# Patient Record
Sex: MALE | Race: BLACK OR AFRICAN AMERICAN | Employment: FULL TIME | URBAN - METROPOLITAN AREA
[De-identification: names, ages, dates, MRNs, and addresses within clinical notes are randomized per-mention and may not be internally consistent; named-entity substitution may affect disease eponyms.]

---

## 2022-03-18 PROBLEM — N52.9 ERECTILE DYSFUNCTION: Status: ACTIVE | Noted: 2021-10-27

## 2022-03-19 PROBLEM — Z80.0 FAMILY HISTORY OF COLON CANCER: Status: ACTIVE | Noted: 2021-12-22

## 2022-03-19 PROBLEM — R97.20 ELEVATED PSA: Status: ACTIVE | Noted: 2021-12-22

## 2022-03-19 PROBLEM — Z82.49 FAMILY HISTORY OF AORTIC ANEURYSM: Status: ACTIVE | Noted: 2021-12-22

## 2022-06-17 ASSESSMENT — ENCOUNTER SYMPTOMS
BACK PAIN: 0
NAUSEA: 0

## 2022-06-17 NOTE — PROGRESS NOTES
[facility]  [address]  [address] [facility], [address] [facility]  [phone]    Boone Shanks  : 1960    Chief Complaint   Patient presents with    New Patient     hydrocele    Other     epididymal cyst         HPI     Boone Shanks is a 64 y.o. male Referred by Adrienne Daniel for evaluation and treatment of scrotal mass and elevated PSA. Previously saw urology in [place]. PSA 4.99 in ,   PSA 4.3 with 22% free PSA in . He lives in [place] and is moving here   Scrotal US in : FINDINGS:   Right testicle: Normal. No mass. No torsion. Normal vascular flow. Left testicle: Normal. No mass. No torsion. Normal vascular flow. Epididymides: Right epididymis unremarkable. Normal blood flow. 8 by 6 x 6 mm   left epididymal cyst.   Scrotum: Small right hydrocele. He states that the left testicular mass is getting bigger. It is painless. No family hx of prostate cancer. No voiding problems, no hematuria. Past Medical History:   Diagnosis Date    Cerumen impaction     Ear pain     Gastroesophageal reflux disease 2016    Otitis externa 2016    Unspecified hearing loss, unspecified ear      Past Surgical History:   Procedure Laterality Date    KNEE ARTHROSCOPY      KNEE CARTILAGE SURGERY       Current Outpatient Medications   Medication Sig Dispense Refill    pantoprazole (PROTONIX) 40 MG tablet Take 40 mg by mouth daily      dexlansoprazole (DEXILANT) 60 MG CPDR delayed release capsule TAKE 1 CAPSULE DAILY      fluocinolone (DERMOTIC) 0.01 % OIL oil 1 drop in affected ear TID, PRN      latanoprost (XALATAN) 0.005 % ophthalmic solution INSTILL ONE DROP INTO EACH EYE EVERY NIGHT AS DIRECTED      sildenafil (VIAGRA) 100 MG tablet Take 1/2 - 1 tablet 30 minutes prior to intercourse as needed. No current facility-administered medications for this visit.      No Known Allergies  Social History     Socioeconomic History    Marital status:      Spouse name: Not on file    Number of children: Not on file    Years of education: Not on file    Highest education level: Not on file   Occupational History    Not on file   Tobacco Use    Smoking status: Never Smoker    Smokeless tobacco: Never Used   Substance and Sexual Activity    Alcohol use: Yes    Drug use: Never    Sexual activity: Not on file   Other Topics Concern    Not on file   Social History Narrative    Not on file     Social Determinants of Health     Financial Resource Strain:     Difficulty of Paying Living Expenses: Not on file   Food Insecurity:     Worried About Running Out of Food in the Last Year: Not on file    Ana of Food in the Last Year: Not on file   Transportation Needs:     Lack of Transportation (Medical): Not on file    Lack of Transportation (Non-Medical):  Not on file   Physical Activity:     Days of Exercise per Week: Not on file    Minutes of Exercise per Session: Not on file   Stress:     Feeling of Stress : Not on file   Social Connections:     Frequency of Communication with Friends and Family: Not on file    Frequency of Social Gatherings with Friends and Family: Not on file    Attends Zoroastrian Services: Not on file    Active Member of [address] [place] [address] or Organizations: Not on file    Attends Club or Organization Meetings: Not on file    Marital Status: Not on file   Intimate Partner Violence:     Fear of Current or Ex-Partner: Not on file    Emotionally Abused: Not on file    Physically Abused: Not on file    Sexually Abused: Not on file   Housing Stability:     Unable to Pay for Housing in the Last Year: Not on file    Number of [place] in the Last Year: Not on file    Unstable Housing in the Last Year: Not on file     Family History   Problem Relation Age of Onset    Abdominal aortic aneurysm Mother     Prostate Cancer Neg Hx     Colon Cancer Brother 46    Abdominal aortic aneurysm Father        Review of Systems  Constitutional:   Negative for fever.  Cardiovascular:  Negative for chest pain. GI:  Negative for nausea. Genitourinary:  Negative for urinary burning. Musculoskeletal:  Negative for back pain. There were no vitals taken for this visit. Physical Exam  General   Mental Status - Patient is alert and oriented X3. General Appearance - Not in acute distress. Build & Nutrition - Well nourished and Well developed. Gait - Normal.      Eye   Pupil - Bilateral - Normal, Equal and Round. Chest and Lung Exam   Auscultation:   Lung Sounds: - Normal, clear to auscultation. Cardiovascular   Cardiovascular examination reveals  - normal heart sounds, regular rate and rhythm with no murmurs. Abdomen   Palpation/Percussion: Palpation and Percussion of the abdomen reveal - No Rebound tenderness, No Rigidity (guarding), No hepatosplenomegaly and No Palpable abdominal masses. Auscultation: Auscultation of the abdomen reveals - Bowel sounds normal.      Male Genitourinary   Evaluation of genitourinary system reveals - scrotum non-tender, 8 mm nodule left anterior lateral testis, nontender. , possible small cyst of left inferior epididymis. , urethral meatus normal, no discharge, normal penis and normal seminal vesicles. Rectal   Anorectal Exam: Prostate - 1+, no nodules. Peripheral Vascular   Lower Extremity: Inspection - Bilateral - Inspection Normal.      Neurologic   Neurologic evaluation reveals  - upper and lower extremity deep tendon reflexes intact bilaterally . Cranial Nerves: - Cranial nerves are grossly intact. Neuropsychiatric   The patient's mood and affect are described as  - normal.      Musculoskeletal  Global Assessment   Examination of related systems reveals - no generalized swelling or edema of extremities. Lymphatic  General Lymphatics   Description - No Generalized lymphadenopathy. Tenderness - Non Tender.       Urinalysis  UA - Dipstick  Results for orders placed or performed in visit on 06/20/22   AMB

## 2022-06-20 ENCOUNTER — OFFICE VISIT (OUTPATIENT)
Dept: UROLOGY | Age: 62
End: 2022-06-20
Payer: COMMERCIAL

## 2022-06-20 DIAGNOSIS — R97.20 ELEVATED PSA: ICD-10-CM

## 2022-06-20 DIAGNOSIS — N50.89 TESTICULAR MASS: ICD-10-CM

## 2022-06-20 DIAGNOSIS — N43.3 HYDROCELE, UNSPECIFIED HYDROCELE TYPE: Primary | ICD-10-CM

## 2022-06-20 LAB
BILIRUBIN, URINE, POC: NEGATIVE
BLOOD URINE, POC: NEGATIVE
GLUCOSE URINE, POC: NEGATIVE
KETONES, URINE, POC: NEGATIVE
LEUKOCYTE ESTERASE, URINE, POC: NEGATIVE
NITRITE, URINE, POC: NEGATIVE
PH, URINE, POC: 7.5 (ref 4.6–8)
PROTEIN,URINE, POC: NEGATIVE
SPECIFIC GRAVITY, URINE, POC: 1.02 (ref 1–1.03)
URINALYSIS CLARITY, POC: NORMAL
URINALYSIS COLOR, POC: NORMAL
UROBILINOGEN, POC: NORMAL

## 2022-06-20 PROCEDURE — 76870 US EXAM SCROTUM: CPT | Performed by: UROLOGY

## 2022-06-20 PROCEDURE — 81003 URINALYSIS AUTO W/O SCOPE: CPT | Performed by: UROLOGY

## 2022-06-20 PROCEDURE — 99204 OFFICE O/P NEW MOD 45 MIN: CPT | Performed by: UROLOGY

## 2022-06-20 RX ORDER — PANTOPRAZOLE SODIUM 40 MG/1
40 TABLET, DELAYED RELEASE ORAL DAILY
COMMUNITY
Start: 2021-11-02 | End: 2022-09-12 | Stop reason: SDUPTHER

## 2022-09-12 ENCOUNTER — OFFICE VISIT (OUTPATIENT)
Dept: FAMILY MEDICINE CLINIC | Facility: CLINIC | Age: 62
End: 2022-09-12
Payer: COMMERCIAL

## 2022-09-12 VITALS
WEIGHT: 236 LBS | HEIGHT: 70 IN | HEART RATE: 80 BPM | OXYGEN SATURATION: 98 % | BODY MASS INDEX: 33.79 KG/M2 | SYSTOLIC BLOOD PRESSURE: 130 MMHG | DIASTOLIC BLOOD PRESSURE: 80 MMHG

## 2022-09-12 DIAGNOSIS — Z13.220 SCREENING FOR HYPERLIPIDEMIA: ICD-10-CM

## 2022-09-12 DIAGNOSIS — Z00.00 ENCOUNTER FOR ROUTINE ADULT HEALTH EXAMINATION WITHOUT ABNORMAL FINDINGS: Primary | ICD-10-CM

## 2022-09-12 DIAGNOSIS — K21.9 GASTROESOPHAGEAL REFLUX DISEASE, UNSPECIFIED WHETHER ESOPHAGITIS PRESENT: ICD-10-CM

## 2022-09-12 DIAGNOSIS — R97.20 ELEVATED PSA: ICD-10-CM

## 2022-09-12 DIAGNOSIS — Z82.49 FAMILY HISTORY OF AORTIC ANEURYSM: ICD-10-CM

## 2022-09-12 PROBLEM — N50.89 TESTICULAR LUMP: Status: ACTIVE | Noted: 2021-10-27

## 2022-09-12 LAB
BASOPHILS # BLD: 0.1 K/UL (ref 0–0.2)
BASOPHILS NFR BLD: 1 % (ref 0–2)
DIFFERENTIAL METHOD BLD: ABNORMAL
EOSINOPHIL # BLD: 0.1 K/UL (ref 0–0.8)
EOSINOPHIL NFR BLD: 1 % (ref 0.5–7.8)
ERYTHROCYTE [DISTWIDTH] IN BLOOD BY AUTOMATED COUNT: 14.4 % (ref 11.9–14.6)
HCT VFR BLD AUTO: 47.3 % (ref 41.1–50.3)
HGB BLD-MCNC: 15.1 G/DL (ref 13.6–17.2)
IMM GRANULOCYTES # BLD AUTO: 0.1 K/UL (ref 0–0.5)
IMM GRANULOCYTES NFR BLD AUTO: 1 % (ref 0–5)
LYMPHOCYTES # BLD: 1.8 K/UL (ref 0.5–4.6)
LYMPHOCYTES NFR BLD: 17 % (ref 13–44)
MCH RBC QN AUTO: 30.1 PG (ref 26.1–32.9)
MCHC RBC AUTO-ENTMCNC: 31.9 G/DL (ref 31.4–35)
MCV RBC AUTO: 94.4 FL (ref 79.6–97.8)
MONOCYTES # BLD: 0.9 K/UL (ref 0.1–1.3)
MONOCYTES NFR BLD: 9 % (ref 4–12)
NEUTS SEG # BLD: 7.4 K/UL (ref 1.7–8.2)
NEUTS SEG NFR BLD: 72 % (ref 43–78)
NRBC # BLD: 0 K/UL (ref 0–0.2)
PLATELET # BLD AUTO: 223 K/UL (ref 150–450)
PMV BLD AUTO: 13.3 FL (ref 9.4–12.3)
RBC # BLD AUTO: 5.01 M/UL (ref 4.23–5.6)
WBC # BLD AUTO: 10.3 K/UL (ref 4.3–11.1)

## 2022-09-12 PROCEDURE — 99396 PREV VISIT EST AGE 40-64: CPT | Performed by: FAMILY MEDICINE

## 2022-09-12 RX ORDER — PANTOPRAZOLE SODIUM 40 MG/1
40 TABLET, DELAYED RELEASE ORAL DAILY
Qty: 90 TABLET | Refills: 3 | Status: SHIPPED | OUTPATIENT
Start: 2022-09-12

## 2022-09-12 RX ORDER — TIMOLOL MALEATE 6.8 MG/ML
SOLUTION/ DROPS OPHTHALMIC
COMMUNITY
Start: 2022-02-16

## 2022-09-12 RX ORDER — TRIAMCINOLONE ACETONIDE 1 MG/G
CREAM TOPICAL
COMMUNITY
Start: 2022-01-09

## 2022-09-12 ASSESSMENT — ENCOUNTER SYMPTOMS
CHEST TIGHTNESS: 0
SHORTNESS OF BREATH: 0
ABDOMINAL PAIN: 0
BACK PAIN: 0
WHEEZING: 0

## 2022-09-12 ASSESSMENT — PATIENT HEALTH QUESTIONNAIRE - PHQ9
1. LITTLE INTEREST OR PLEASURE IN DOING THINGS: 0
SUM OF ALL RESPONSES TO PHQ QUESTIONS 1-9: 0
SUM OF ALL RESPONSES TO PHQ QUESTIONS 1-9: 0
2. FEELING DOWN, DEPRESSED OR HOPELESS: 0
SUM OF ALL RESPONSES TO PHQ QUESTIONS 1-9: 0
SUM OF ALL RESPONSES TO PHQ QUESTIONS 1-9: 0
SUM OF ALL RESPONSES TO PHQ9 QUESTIONS 1 & 2: 0

## 2022-09-12 NOTE — PROGRESS NOTES
Annetta Osborn is a 64 y.o. male who presents today for the following:  Chief Complaint   Patient presents with    Medication Check       No Known Allergies    Current Outpatient Medications   Medication Sig Dispense Refill    pantoprazole (PROTONIX) 40 MG tablet Take 40 mg by mouth daily      dexlansoprazole (DEXILANT) 60 MG CPDR delayed release capsule TAKE 1 CAPSULE DAILY      fluocinolone (DERMOTIC) 0.01 % OIL oil 1 drop in affected ear TID, PRN      latanoprost (XALATAN) 0.005 % ophthalmic solution INSTILL ONE DROP INTO EACH EYE EVERY NIGHT AS DIRECTED      sildenafil (VIAGRA) 100 MG tablet Take 1/2 - 1 tablet 30 minutes prior to intercourse as needed. No current facility-administered medications for this visit. Past Medical History:   Diagnosis Date    Cerumen impaction     Ear pain     Gastroesophageal reflux disease 7/20/2016    Otitis externa 7/20/2016    Unspecified hearing loss, unspecified ear        Past Surgical History:   Procedure Laterality Date    KNEE ARTHROSCOPY      KNEE CARTILAGE SURGERY         Social History     Tobacco Use    Smoking status: Never    Smokeless tobacco: Never   Substance Use Topics    Alcohol use: Yes        Family History   Problem Relation Age of Onset    Abdominal aortic aneurysm Mother     Prostate Cancer Neg Hx     Colon Cancer Brother 46    Abdominal aortic aneurysm Father        Is a 60-year-old gentleman here today for routine follow up. Seen in office 3 months ago for initial visit. He recently started a new job in [place] and is in the process of relocating from [facility]. Patient has no significant past medical history. He reports having a strong family history of abdominal aortic aneurysms. Reports both of his parents passed away from these. He has screening ultrasounds done every few years to evaluate. Last one was done last year was normal.  Patient also reports brother was diagnosed with colon cancer last year. He reports having 2 normal colonoscopies in the past.  He does also take a PPI for acid reflux. He does not believe he had endoscopy completed regarding reflux. Lastly he has a history of scrotal mass and elevated PSA. Since last appointment he has established with urology in [place]. He is scheduled to follow up this month with plans to recheck ultrasound and PSA level. Patient has no new concerns today. Review of Systems   Constitutional:  Negative for appetite change and fatigue. Respiratory:  Negative for chest tightness, shortness of breath and wheezing. Cardiovascular:  Negative for chest pain and palpitations. Gastrointestinal:  Negative for abdominal pain. Genitourinary:  Negative for difficulty urinating, scrotal swelling and testicular pain. Musculoskeletal:  Negative for back pain. Skin:  Negative for rash. Neurological:  Negative for dizziness and light-headedness. Psychiatric/Behavioral:  The patient is not nervous/anxious. /80   Pulse 80   Ht 5' 10\" (1.778 m)   Wt 236 lb (107 kg)   SpO2 98%   BMI 33.86 kg/m²     Physical Exam  Constitutional:       Appearance: Normal appearance. He is obese. Eyes:      General: No scleral icterus. Conjunctiva/sclera: Conjunctivae normal.   Cardiovascular:      Rate and Rhythm: Normal rate and regular rhythm. Heart sounds: Normal heart sounds. No murmur heard. Pulmonary:      Effort: Pulmonary effort is normal. No respiratory distress. Breath sounds: Normal breath sounds. No wheezing. Abdominal:      General: There is no distension. Palpations: Abdomen is soft. There is no mass. Tenderness: There is no abdominal tenderness. Comments: Diastasis rectus   Musculoskeletal:      Cervical back: Neck supple. Skin:     General: Skin is warm. Neurological:      General: No focal deficit present. Mental Status: He is alert and oriented to person, place, and time.    Psychiatric: Mood and Affect: Mood normal.         Behavior: Behavior normal.        1. Encounter for routine adult health examination without abnormal findings  -Recommended 30 minutes of moderate intensity physical activity 3-5 days a week  -Recommended following DASH or Mediterranean diet.    -Can get shingrix vaccine at pharmacy to help prevent shingles  -Tdap vaccine is recommended every 10 years   -Recommend COVID 19 vaccines per CDC recommendations  -Recommend annual flu vaccine  -Colonoscopy is recommended to screen for colon cancer starting at age 39. Patient due for 5 year follow up in 2026. -     Comprehensive Metabolic Panel; Future  -     CBC with Auto Differential; Future  2. Gastroesophageal reflux disease, unspecified whether esophagitis present  Assessment & Plan:   Symptoms controlled with PPI. No prior EGD. Orders:  -     pantoprazole (PROTONIX) 40 MG tablet; Take 1 tablet by mouth daily, Disp-90 tablet, R-3Normal  3. Screening for hyperlipidemia  -     Lipid Panel; Future  4. Elevated PSA  Established with urology. -     PSA, Diagnostic  5. Family history of aortic aneurysm  Assessment & Plan:  Last AAA screening 12/2020. Results in care everywhere. No aneurysm but was ectatic. Patient informed, we will call with blood work results within one week. If you have not heard regarding results in over a week, please contact office. You can also review results on [employer] [facility].            Katarina Light MD

## 2022-09-12 NOTE — PATIENT INSTRUCTIONS

## 2022-09-13 LAB — PSA SERPL-MCNC: 8 NG/ML

## 2022-09-14 ENCOUNTER — TELEPHONE (OUTPATIENT)
Dept: FAMILY MEDICINE CLINIC | Facility: CLINIC | Age: 62
End: 2022-09-14

## 2022-09-14 LAB
ALBUMIN SERPL-MCNC: 3.9 G/DL (ref 3.2–4.6)
ALBUMIN/GLOB SERPL: 1.1 {RATIO} (ref 1.2–3.5)
ALP SERPL-CCNC: 54 U/L (ref 50–136)
ALT SERPL-CCNC: 49 U/L (ref 12–65)
ANION GAP SERPL CALC-SCNC: 4 MMOL/L (ref 4–13)
AST SERPL-CCNC: 24 U/L (ref 15–37)
BILIRUB SERPL-MCNC: 0.5 MG/DL (ref 0.2–1.1)
BUN SERPL-MCNC: 13 MG/DL (ref 8–23)
CALCIUM SERPL-MCNC: 9.5 MG/DL (ref 8.3–10.4)
CHLORIDE SERPL-SCNC: 109 MMOL/L (ref 101–110)
CHOLEST SERPL-MCNC: 140 MG/DL
CO2 SERPL-SCNC: 27 MMOL/L (ref 21–32)
CREAT SERPL-MCNC: 1.2 MG/DL (ref 0.8–1.5)
GLOBULIN SER CALC-MCNC: 3.6 G/DL (ref 2.3–3.5)
GLUCOSE SERPL-MCNC: 63 MG/DL (ref 65–100)
HDLC SERPL-MCNC: 66 MG/DL (ref 40–60)
HDLC SERPL: 2.1 {RATIO}
LDLC SERPL CALC-MCNC: 56.2 MG/DL
POTASSIUM SERPL-SCNC: 4.1 MMOL/L (ref 3.5–5.1)
PROT SERPL-MCNC: 7.5 G/DL (ref 6.3–8.2)
SODIUM SERPL-SCNC: 140 MMOL/L (ref 138–145)
TRIGL SERPL-MCNC: 89 MG/DL (ref 35–150)
VLDLC SERPL CALC-MCNC: 17.8 MG/DL (ref 6–23)

## 2023-02-21 RX ORDER — SILDENAFIL 100 MG/1
100 TABLET, FILM COATED ORAL DAILY PRN
Qty: 30 TABLET | Refills: 5 | Status: SHIPPED | OUTPATIENT
Start: 2023-02-21

## 2023-05-19 ENCOUNTER — APPOINTMENT (RX ONLY)
Dept: URBAN - METROPOLITAN AREA CLINIC 330 | Facility: CLINIC | Age: 63
Setting detail: DERMATOLOGY
End: 2023-05-19

## 2023-05-19 DIAGNOSIS — L20.89 OTHER ATOPIC DERMATITIS: ICD-10-CM | Status: WELL CONTROLLED

## 2023-05-19 DIAGNOSIS — B35.4 TINEA CORPORIS: ICD-10-CM | Status: INADEQUATELY CONTROLLED

## 2023-05-19 PROCEDURE — ? PRESCRIPTION

## 2023-05-19 PROCEDURE — ? COUNSELING

## 2023-05-19 PROCEDURE — ? MDM - TREATMENT GOALS

## 2023-05-19 PROCEDURE — 99203 OFFICE O/P NEW LOW 30 MIN: CPT

## 2023-05-19 PROCEDURE — ? ADDITIONAL NOTES

## 2023-05-19 PROCEDURE — ? PRESCRIPTION MEDICATION MANAGEMENT

## 2023-05-19 RX ORDER — KETOCONAZOLE 20 MG/G
CREAM TOPICAL
Qty: 60 | Refills: 3 | Status: ERX | COMMUNITY
Start: 2023-05-19

## 2023-05-19 RX ORDER — TRIAMCINOLONE ACETONIDE 1 MG/G
CREAM TOPICAL BID
Qty: 80 | Refills: 2 | Status: ERX | COMMUNITY
Start: 2023-05-19

## 2023-05-19 RX ORDER — TERBINAFINE HYDROCHLORIDE 250 MG/1
TABLET ORAL
Qty: 14 | Refills: 0 | Status: ERX | COMMUNITY
Start: 2023-05-19

## 2023-05-19 RX ADMIN — KETOCONAZOLE: 20 CREAM TOPICAL at 00:00

## 2023-05-19 RX ADMIN — TRIAMCINOLONE ACETONIDE: 1 CREAM TOPICAL at 00:00

## 2023-05-19 RX ADMIN — TERBINAFINE HYDROCHLORIDE: 250 TABLET ORAL at 00:00

## 2023-05-19 ASSESSMENT — LOCATION SIMPLE DESCRIPTION DERM
LOCATION SIMPLE: LEFT ANKLE
LOCATION SIMPLE: RIGHT UPPER BACK

## 2023-05-19 ASSESSMENT — LOCATION ZONE DERM
LOCATION ZONE: LEG
LOCATION ZONE: TRUNK

## 2023-05-19 ASSESSMENT — LOCATION DETAILED DESCRIPTION DERM
LOCATION DETAILED: LEFT ANKLE
LOCATION DETAILED: RIGHT INFERIOR MEDIAL UPPER BACK

## 2023-05-19 NOTE — HPI: RASH (ATOPIC DERMATITIS)
How Severe Is Your Atopic Dermatitis?: mild
Is This A New Presentation, Or A Follow-Up?: Follow Up Atopic Dermatitis
Additional History: Patient here for refills of Triamcinolone cream, patient state as soon he apply the cream it get under control with the topical medication

## 2023-05-19 NOTE — PROCEDURE: PRESCRIPTION MEDICATION MANAGEMENT
Continue Regimen: triamcinolone acetonide 0.1 % topical cream BID\\nApply twice daily to affected areas on body x2 weeks, then prn for flares.\\nWill refill today.
Detail Level: Simple
Plan: Pt well-controlled with triamcinolone as needed. Will refill today
Render In Strict Bullet Format?: No

## 2023-05-30 ENCOUNTER — OFFICE VISIT (OUTPATIENT)
Dept: FAMILY MEDICINE CLINIC | Facility: CLINIC | Age: 63
End: 2023-05-30
Payer: COMMERCIAL

## 2023-05-30 ENCOUNTER — NURSE TRIAGE (OUTPATIENT)
Dept: OTHER | Facility: CLINIC | Age: 63
End: 2023-05-30

## 2023-05-30 VITALS
OXYGEN SATURATION: 97 % | RESPIRATION RATE: 18 BRPM | BODY MASS INDEX: 34.22 KG/M2 | WEIGHT: 239 LBS | SYSTOLIC BLOOD PRESSURE: 139 MMHG | HEIGHT: 70 IN | TEMPERATURE: 98.5 F | DIASTOLIC BLOOD PRESSURE: 69 MMHG | HEART RATE: 61 BPM

## 2023-05-30 DIAGNOSIS — L20.9 ATOPIC DERMATITIS, UNSPECIFIED TYPE: Primary | ICD-10-CM

## 2023-05-30 PROCEDURE — 99214 OFFICE O/P EST MOD 30 MIN: CPT | Performed by: FAMILY MEDICINE

## 2023-05-30 RX ORDER — CEPHALEXIN 500 MG/1
500 CAPSULE ORAL 2 TIMES DAILY
Qty: 14 CAPSULE | Refills: 0 | Status: SHIPPED | OUTPATIENT
Start: 2023-05-30 | End: 2023-06-06

## 2023-05-30 RX ORDER — PREDNISONE 10 MG/1
TABLET ORAL
Qty: 1 EACH | Refills: 0 | Status: SHIPPED | OUTPATIENT
Start: 2023-05-30

## 2023-05-30 SDOH — ECONOMIC STABILITY: HOUSING INSECURITY
IN THE LAST 12 MONTHS, WAS THERE A TIME WHEN YOU DID NOT HAVE A STEADY PLACE TO SLEEP OR SLEPT IN A SHELTER (INCLUDING NOW)?: NO

## 2023-05-30 SDOH — ECONOMIC STABILITY: FOOD INSECURITY: WITHIN THE PAST 12 MONTHS, YOU WORRIED THAT YOUR FOOD WOULD RUN OUT BEFORE YOU GOT MONEY TO BUY MORE.: NEVER TRUE

## 2023-05-30 SDOH — ECONOMIC STABILITY: FOOD INSECURITY: WITHIN THE PAST 12 MONTHS, THE FOOD YOU BOUGHT JUST DIDN'T LAST AND YOU DIDN'T HAVE MONEY TO GET MORE.: NEVER TRUE

## 2023-05-30 SDOH — ECONOMIC STABILITY: INCOME INSECURITY: HOW HARD IS IT FOR YOU TO PAY FOR THE VERY BASICS LIKE FOOD, HOUSING, MEDICAL CARE, AND HEATING?: NOT HARD AT ALL

## 2023-05-30 ASSESSMENT — PATIENT HEALTH QUESTIONNAIRE - PHQ9
SUM OF ALL RESPONSES TO PHQ QUESTIONS 1-9: 0
1. LITTLE INTEREST OR PLEASURE IN DOING THINGS: 0
SUM OF ALL RESPONSES TO PHQ QUESTIONS 1-9: 0
SUM OF ALL RESPONSES TO PHQ QUESTIONS 1-9: 0
SUM OF ALL RESPONSES TO PHQ9 QUESTIONS 1 & 2: 0
2. FEELING DOWN, DEPRESSED OR HOPELESS: 0
SUM OF ALL RESPONSES TO PHQ QUESTIONS 1-9: 0

## 2023-05-30 ASSESSMENT — ENCOUNTER SYMPTOMS
ROS SKIN COMMENTS: ITCHING
SHORTNESS OF BREATH: 0
WHEEZING: 0

## 2023-05-30 NOTE — PROGRESS NOTES
Fawn Azar is a 58 y.o. male who presents today for the following:  Chief Complaint   Patient presents with    Blister     Patient states that he noticed some blisters on Left foot a couple of weeks ago and now has noticed blisters on both or his hands. They are very itchy, it gets painful when scratching. Pt used ketaconazole 2% and Kenalog cream, it helped a little but it didn't clear and it looks now it's spreading. Denies fever. No Known Allergies    Current Outpatient Medications   Medication Sig Dispense Refill    sildenafil (VIAGRA) 100 MG tablet Take 1 tablet by mouth daily as needed for Erectile Dysfunction Take 1/2 - 1 tablet 30 minutes prior to intercourse as needed. 30 tablet 5    triamcinolone (KENALOG) 0.1 % cream       Timolol (TIMOPTIC) 0.5 % SOLN ophthalmic solution       pantoprazole (PROTONIX) 40 MG tablet Take 1 tablet by mouth daily 90 tablet 3    fluocinolone (DERMOTIC) 0.01 % OIL oil 1 drop in affected ear TID, PRN      latanoprost (XALATAN) 0.005 % ophthalmic solution INSTILL ONE DROP INTO EACH EYE EVERY NIGHT AS DIRECTED       No current facility-administered medications for this visit. Past Medical History:   Diagnosis Date    Cerumen impaction     Ear pain     Gastroesophageal reflux disease 7/20/2016    Otitis externa 7/20/2016    Unspecified hearing loss, unspecified ear        Past Surgical History:   Procedure Laterality Date    KNEE ARTHROSCOPY      KNEE CARTILAGE SURGERY         Social History     Tobacco Use    Smoking status: Never    Smokeless tobacco: Never   Substance Use Topics    Alcohol use: Yes        Family History   Problem Relation Age of Onset    Abdominal aortic aneurysm Mother     Prostate Cancer Neg Hx     Colon Cancer Brother 46    Abdominal aortic aneurysm Father        Patient is here today for rash on foot and hand. Patient was seen by dermatology and placed on oral terbinafine and ketoconazole cream for past week without relief.  Patient feels

## 2023-05-30 NOTE — TELEPHONE ENCOUNTER
Location of patient: [address] eng from [place] [address] at [facility] with [employer]. Subjective: Caller states \"I saw the NP at my Dermatologist office who gave me a cream for my foot - this was no help - and now it has also spread to my hand\"     Current Symptoms: Left hand and left foot, itching, bumps (size of bumps is like the end of a pencil eraser), they are \"hard\" to pressure, bumps are raised, fluid filled bumps (clear yellow fluid), flesh colored bump, itches (severe),     Onset: 2 weeks ago (for foot - last 3 days for hand); worsening    Associated Symptoms: NA    Pain Severity: 0/10; N/A; none    Temperature: denies       What has been tried: Ketoconazole 2% cream, cortisone cream (no help from either cream)    LMP: NA Pregnant: NA    Recommended disposition: Go to Office Now    Care advice provided: Triple antibiotic on broken blisters     Patient verbalizes understanding; denies any other questions or concerns; instructed to call back for any new or worsening symptoms. Patient/Caller agrees with recommended disposition; writer provided warm transfer to [address] [place] [address] at [facility] for appointment scheduling    Attention Provider: Thank you for allowing me to participate in the care of your patient. The patient was connected to triage in response to information provided to the ECC/PSC. Please do not respond through this encounter as the response is not directed to a shared pool.       Reason for Disposition   Large or small blisters on skin (i.e., fluid filled bubbles or sacs)    Protocols used: Rash or Redness - Widespread-ADULT-[place]

## 2023-06-08 ENCOUNTER — OFFICE VISIT (OUTPATIENT)
Dept: FAMILY MEDICINE CLINIC | Facility: CLINIC | Age: 63
End: 2023-06-08
Payer: COMMERCIAL

## 2023-06-08 VITALS
BODY MASS INDEX: 33.79 KG/M2 | HEART RATE: 67 BPM | WEIGHT: 236 LBS | TEMPERATURE: 98.3 F | DIASTOLIC BLOOD PRESSURE: 85 MMHG | HEIGHT: 70 IN | SYSTOLIC BLOOD PRESSURE: 130 MMHG | OXYGEN SATURATION: 99 %

## 2023-06-08 DIAGNOSIS — L20.9 ATOPIC DERMATITIS, UNSPECIFIED TYPE: ICD-10-CM

## 2023-06-08 DIAGNOSIS — N50.89 TESTICULAR LUMP: Primary | ICD-10-CM

## 2023-06-08 PROCEDURE — 99214 OFFICE O/P EST MOD 30 MIN: CPT | Performed by: FAMILY MEDICINE

## 2023-06-08 ASSESSMENT — PATIENT HEALTH QUESTIONNAIRE - PHQ9
SUM OF ALL RESPONSES TO PHQ QUESTIONS 1-9: 0
1. LITTLE INTEREST OR PLEASURE IN DOING THINGS: 0
2. FEELING DOWN, DEPRESSED OR HOPELESS: 0
SUM OF ALL RESPONSES TO PHQ9 QUESTIONS 1 & 2: 0
SUM OF ALL RESPONSES TO PHQ QUESTIONS 1-9: 0

## 2023-06-08 ASSESSMENT — ENCOUNTER SYMPTOMS
ROS SKIN COMMENTS: ITCHING
SHORTNESS OF BREATH: 0
WHEEZING: 0

## 2023-06-08 NOTE — PROGRESS NOTES
was ordered. Do not see where it was completed. Review of Systems   Respiratory:  Negative for shortness of breath and wheezing. Skin:  Positive for rash. itching   Neurological:  Negative for dizziness. BP (!) 144/68   Pulse 67   Temp 98.3 °F (36.8 °C) (Oral)   Ht 5' 10\" (1.778 m)   Wt 236 lb (107 kg)   SpO2 99%   BMI 33.86 kg/m²     Physical Exam  Vitals reviewed. Constitutional:       Appearance: Normal appearance. Pulmonary:      Effort: Pulmonary effort is normal.   Skin:     Findings: Rash (right hand no longer has raised papules. Skin is dry and peeling, no erythema, left lower leg/ankle also with dry peeling skin) present. Neurological:      Mental Status: He is alert. 1. Testicular lump  Check ultrasound. -     US SCROTUM AND TESTICLES; Future  2. Atopic dermatitis, unspecified type   Continue topical steroid. No more systemic steroids needed. Completed course of antibiotic. No signs of further infection.           Omega Bates MD

## 2023-06-26 ENCOUNTER — HOSPITAL ENCOUNTER (OUTPATIENT)
Dept: ULTRASOUND IMAGING | Age: 63
Discharge: HOME | End: 2023-06-29
Payer: COMMERCIAL

## 2023-06-26 DIAGNOSIS — N50.89 TESTICULAR LUMP: ICD-10-CM

## 2023-06-26 PROCEDURE — 76870 US EXAM SCROTUM: CPT

## 2023-10-01 DIAGNOSIS — K21.9 GASTROESOPHAGEAL REFLUX DISEASE, UNSPECIFIED WHETHER ESOPHAGITIS PRESENT: ICD-10-CM

## 2023-10-02 RX ORDER — PANTOPRAZOLE SODIUM 40 MG/1
40 TABLET, DELAYED RELEASE ORAL DAILY
Qty: 90 TABLET | Refills: 3 | OUTPATIENT
Start: 2023-10-02

## 2023-11-06 ENCOUNTER — OFFICE VISIT (OUTPATIENT)
Dept: FAMILY MEDICINE CLINIC | Facility: CLINIC | Age: 63
End: 2023-11-06
Payer: COMMERCIAL

## 2023-11-06 VITALS
DIASTOLIC BLOOD PRESSURE: 84 MMHG | TEMPERATURE: 98 F | HEIGHT: 70 IN | HEART RATE: 59 BPM | SYSTOLIC BLOOD PRESSURE: 129 MMHG | OXYGEN SATURATION: 98 % | WEIGHT: 234 LBS | BODY MASS INDEX: 33.5 KG/M2

## 2023-11-06 DIAGNOSIS — K21.9 GASTROESOPHAGEAL REFLUX DISEASE, UNSPECIFIED WHETHER ESOPHAGITIS PRESENT: ICD-10-CM

## 2023-11-06 DIAGNOSIS — N52.9 ERECTILE DYSFUNCTION, UNSPECIFIED ERECTILE DYSFUNCTION TYPE: ICD-10-CM

## 2023-11-06 DIAGNOSIS — Z00.00 ENCOUNTER FOR ROUTINE ADULT HEALTH EXAMINATION WITHOUT ABNORMAL FINDINGS: ICD-10-CM

## 2023-11-06 DIAGNOSIS — Z82.49 FAMILY HISTORY OF ABDOMINAL AORTIC ANEURYSM (AAA): ICD-10-CM

## 2023-11-06 DIAGNOSIS — Z00.00 ENCOUNTER FOR ROUTINE ADULT HEALTH EXAMINATION WITHOUT ABNORMAL FINDINGS: Primary | ICD-10-CM

## 2023-11-06 DIAGNOSIS — Z23 ENCOUNTER FOR IMMUNIZATION: ICD-10-CM

## 2023-11-06 DIAGNOSIS — Z82.49 FAMILY HISTORY OF AORTIC ANEURYSM: ICD-10-CM

## 2023-11-06 DIAGNOSIS — Z80.0 FAMILY HISTORY OF COLON CANCER: ICD-10-CM

## 2023-11-06 DIAGNOSIS — R97.20 ELEVATED PSA: ICD-10-CM

## 2023-11-06 DIAGNOSIS — L20.9 ATOPIC DERMATITIS, UNSPECIFIED TYPE: ICD-10-CM

## 2023-11-06 LAB
ALBUMIN SERPL-MCNC: 4.2 G/DL (ref 3.2–4.6)
ALBUMIN/GLOB SERPL: 1.4 (ref 0.4–1.6)
ALP SERPL-CCNC: 51 U/L (ref 50–136)
ALT SERPL-CCNC: 37 U/L (ref 12–65)
ANION GAP SERPL CALC-SCNC: 6 MMOL/L (ref 2–11)
AST SERPL-CCNC: 21 U/L (ref 15–37)
BASOPHILS # BLD: 0.1 K/UL (ref 0–0.2)
BASOPHILS NFR BLD: 1 % (ref 0–2)
BILIRUB SERPL-MCNC: 0.7 MG/DL (ref 0.2–1.1)
BUN SERPL-MCNC: 17 MG/DL (ref 8–23)
CALCIUM SERPL-MCNC: 9.4 MG/DL (ref 8.3–10.4)
CHLORIDE SERPL-SCNC: 106 MMOL/L (ref 101–110)
CHOLEST SERPL-MCNC: 146 MG/DL
CO2 SERPL-SCNC: 27 MMOL/L (ref 21–32)
CREAT SERPL-MCNC: 1.1 MG/DL (ref 0.8–1.5)
DIFFERENTIAL METHOD BLD: ABNORMAL
EOSINOPHIL # BLD: 0.1 K/UL (ref 0–0.8)
EOSINOPHIL NFR BLD: 1 % (ref 0.5–7.8)
ERYTHROCYTE [DISTWIDTH] IN BLOOD BY AUTOMATED COUNT: 14.6 % (ref 11.9–14.6)
GLOBULIN SER CALC-MCNC: 3.1 G/DL (ref 2.8–4.5)
GLUCOSE SERPL-MCNC: 77 MG/DL (ref 65–100)
HCT VFR BLD AUTO: 45.7 % (ref 41.1–50.3)
HDLC SERPL-MCNC: 62 MG/DL (ref 40–60)
HDLC SERPL: 2.4
HGB BLD-MCNC: 14.8 G/DL (ref 13.6–17.2)
IMM GRANULOCYTES # BLD AUTO: 0.1 K/UL (ref 0–0.5)
IMM GRANULOCYTES NFR BLD AUTO: 1 % (ref 0–5)
LDLC SERPL CALC-MCNC: 72.8 MG/DL
LYMPHOCYTES # BLD: 1.8 K/UL (ref 0.5–4.6)
LYMPHOCYTES NFR BLD: 17 % (ref 13–44)
MCH RBC QN AUTO: 30 PG (ref 26.1–32.9)
MCHC RBC AUTO-ENTMCNC: 32.4 G/DL (ref 31.4–35)
MCV RBC AUTO: 92.7 FL (ref 82–102)
MONOCYTES # BLD: 0.8 K/UL (ref 0.1–1.3)
MONOCYTES NFR BLD: 7 % (ref 4–12)
NEUTS SEG # BLD: 8 K/UL (ref 1.7–8.2)
NEUTS SEG NFR BLD: 74 % (ref 43–78)
NRBC # BLD: 0 K/UL (ref 0–0.2)
PLATELET # BLD AUTO: 187 K/UL (ref 150–450)
PMV BLD AUTO: 13.7 FL (ref 9.4–12.3)
POTASSIUM SERPL-SCNC: 4.2 MMOL/L (ref 3.5–5.1)
PROT SERPL-MCNC: 7.3 G/DL (ref 6.3–8.2)
PSA SERPL-MCNC: 6.5 NG/ML
RBC # BLD AUTO: 4.93 M/UL (ref 4.23–5.6)
SODIUM SERPL-SCNC: 139 MMOL/L (ref 133–143)
TRIGL SERPL-MCNC: 56 MG/DL (ref 35–150)
VLDLC SERPL CALC-MCNC: 11.2 MG/DL (ref 6–23)
WBC # BLD AUTO: 10.8 K/UL (ref 4.3–11.1)

## 2023-11-06 PROCEDURE — 99396 PREV VISIT EST AGE 40-64: CPT | Performed by: FAMILY MEDICINE

## 2023-11-06 PROCEDURE — 90674 CCIIV4 VAC NO PRSV 0.5 ML IM: CPT | Performed by: FAMILY MEDICINE

## 2023-11-06 PROCEDURE — 90471 IMMUNIZATION ADMIN: CPT | Performed by: FAMILY MEDICINE

## 2023-11-06 RX ORDER — SILDENAFIL 100 MG/1
100 TABLET, FILM COATED ORAL DAILY PRN
Qty: 30 TABLET | Refills: 5 | Status: SHIPPED | OUTPATIENT
Start: 2023-11-06

## 2023-11-06 RX ORDER — PANTOPRAZOLE SODIUM 40 MG/1
40 TABLET, DELAYED RELEASE ORAL DAILY
Qty: 90 TABLET | Refills: 3 | Status: SHIPPED | OUTPATIENT
Start: 2023-11-06

## 2023-11-06 RX ORDER — CEPHALEXIN 500 MG/1
500 CAPSULE ORAL 2 TIMES DAILY
Qty: 14 CAPSULE | Refills: 0 | Status: SHIPPED | OUTPATIENT
Start: 2023-11-06 | End: 2023-11-13

## 2023-11-06 RX ORDER — BETAMETHASONE DIPROPIONATE 0.5 MG/G
CREAM TOPICAL
Qty: 45 G | Refills: 3 | Status: SHIPPED | OUTPATIENT
Start: 2023-11-06

## 2023-11-06 ASSESSMENT — PATIENT HEALTH QUESTIONNAIRE - PHQ9
SUM OF ALL RESPONSES TO PHQ9 QUESTIONS 1 & 2: 0
SUM OF ALL RESPONSES TO PHQ QUESTIONS 1-9: 0
SUM OF ALL RESPONSES TO PHQ QUESTIONS 1-9: 0
2. FEELING DOWN, DEPRESSED OR HOPELESS: 0
1. LITTLE INTEREST OR PLEASURE IN DOING THINGS: 0
SUM OF ALL RESPONSES TO PHQ QUESTIONS 1-9: 0
SUM OF ALL RESPONSES TO PHQ QUESTIONS 1-9: 0

## 2023-11-06 ASSESSMENT — ENCOUNTER SYMPTOMS
ROS SKIN COMMENTS: ITCHING
WHEEZING: 0
ABDOMINAL PAIN: 0
SHORTNESS OF BREATH: 0

## 2023-11-06 NOTE — PROGRESS NOTES
Michael Ruiz is a 61 y.o. male who presents today for the following:  Chief Complaint   Patient presents with    Annual Exam       No Known Allergies    Current Outpatient Medications   Medication Sig Dispense Refill    sildenafil (VIAGRA) 100 MG tablet Take 1 tablet by mouth daily as needed for Erectile Dysfunction Take 1/2 - 1 tablet 30 minutes prior to intercourse as needed. 30 tablet 5    triamcinolone (KENALOG) 0.1 % cream       Timolol (TIMOPTIC) 0.5 % SOLN ophthalmic solution       pantoprazole (PROTONIX) 40 MG tablet Take 1 tablet by mouth daily 90 tablet 3    fluocinolone (DERMOTIC) 0.01 % OIL oil 1 drop in affected ear TID, PRN      latanoprost (XALATAN) 0.005 % ophthalmic solution INSTILL ONE DROP INTO EACH EYE EVERY NIGHT AS DIRECTED       No current facility-administered medications for this visit. Past Medical History:   Diagnosis Date    Cerumen impaction     Ear pain     Gastroesophageal reflux disease 7/20/2016    Otitis externa 7/20/2016    Unspecified hearing loss, unspecified ear        Past Surgical History:   Procedure Laterality Date    KNEE ARTHROSCOPY      KNEE CARTILAGE SURGERY         Social History     Tobacco Use    Smoking status: Never    Smokeless tobacco: Never   Substance Use Topics    Alcohol use: Yes     Comment: Rearly have any        Family History   Problem Relation Age of Onset    Abdominal aortic aneurysm Mother     Colon Cancer Brother 46    Abdominal aortic aneurysm Father     Prostate Cancer Neg Hx        Is a 77-year-old gentleman here today for routine follow up. Patient has no significant past medical history. He reports having a strong family history of abdominal aortic aneurysms. Reports both of his parents passed away from these. He has screening ultrasounds done every few years to evaluate. Last one was done last year was normal.  Patient also reports brother was diagnosed with colon cancer last year.   He reports having 2 normal colonoscopies in the

## 2023-11-07 NOTE — ASSESSMENT & PLAN NOTE
Last AAA screening 12/2020. Results in care everywhere. No aneurysm but was ectatic. Will repeat ultrasound.

## 2023-11-17 ENCOUNTER — HOSPITAL ENCOUNTER (OUTPATIENT)
Dept: ULTRASOUND IMAGING | Age: 63
Discharge: HOME | End: 2023-11-17
Payer: COMMERCIAL

## 2023-11-17 DIAGNOSIS — Z82.49 FAMILY HISTORY OF ABDOMINAL AORTIC ANEURYSM (AAA): ICD-10-CM

## 2023-11-17 PROCEDURE — 76706 US ABDL AORTA SCREEN AAA: CPT

## 2023-12-08 RX ORDER — TRIAMCINOLONE ACETONIDE 1 MG/G
CREAM TOPICAL BID
Qty: 30 | Refills: 0 | Status: ERX

## 2023-12-29 ENCOUNTER — APPOINTMENT (RX ONLY)
Dept: URBAN - METROPOLITAN AREA CLINIC 330 | Facility: CLINIC | Age: 63
Setting detail: DERMATOLOGY
End: 2023-12-29

## 2023-12-29 DIAGNOSIS — L20.89 OTHER ATOPIC DERMATITIS: ICD-10-CM

## 2023-12-29 DIAGNOSIS — B35.4 TINEA CORPORIS: ICD-10-CM

## 2023-12-29 PROCEDURE — ? PRESCRIPTION

## 2023-12-29 PROCEDURE — ? ADDITIONAL NOTES

## 2023-12-29 PROCEDURE — ? COUNSELING

## 2023-12-29 PROCEDURE — ? OTHER

## 2023-12-29 PROCEDURE — ? MDM - TREATMENT GOALS

## 2023-12-29 PROCEDURE — 99214 OFFICE O/P EST MOD 30 MIN: CPT

## 2023-12-29 PROCEDURE — ? PRESCRIPTION MEDICATION MANAGEMENT

## 2023-12-29 RX ORDER — RUXOLITINIB 15 MG/G
CREAM TOPICAL
Qty: 60 | Refills: 2 | Status: ERX | COMMUNITY
Start: 2023-12-29

## 2023-12-29 RX ORDER — CLOBETASOL PROPIONATE 0.5 MG/G
OINTMENT TOPICAL
Qty: 60 | Refills: 2 | Status: ERX | COMMUNITY
Start: 2023-12-29

## 2023-12-29 RX ORDER — TERBINAFINE HYDROCHLORIDE 250 MG/1
TABLET ORAL
Qty: 14 | Refills: 0 | Status: CANCELLED

## 2023-12-29 RX ORDER — KETOCONAZOLE 20 MG/G
CREAM TOPICAL
Qty: 60 | Refills: 3 | Status: CANCELLED

## 2023-12-29 RX ORDER — TRIAMCINOLONE ACETONIDE 1 MG/G
OINTMENT TOPICAL
Qty: 453.6 | Refills: 2 | Status: ERX | COMMUNITY
Start: 2023-12-29

## 2023-12-29 RX ADMIN — TRIAMCINOLONE ACETONIDE: 1 OINTMENT TOPICAL at 00:00

## 2023-12-29 RX ADMIN — CLOBETASOL PROPIONATE: 0.5 OINTMENT TOPICAL at 00:00

## 2023-12-29 RX ADMIN — RUXOLITINIB: 15 CREAM TOPICAL at 00:00

## 2023-12-29 ASSESSMENT — LOCATION DETAILED DESCRIPTION DERM
LOCATION DETAILED: RIGHT INFERIOR MEDIAL UPPER BACK
LOCATION DETAILED: LEFT ANKLE

## 2023-12-29 ASSESSMENT — LOCATION ZONE DERM
LOCATION ZONE: LEG
LOCATION ZONE: TRUNK

## 2023-12-29 ASSESSMENT — ITCH NUMERIC RATING SCALE: HOW SEVERE IS YOUR ITCHING?: 6

## 2023-12-29 ASSESSMENT — LOCATION SIMPLE DESCRIPTION DERM
LOCATION SIMPLE: LEFT ANKLE
LOCATION SIMPLE: RIGHT UPPER BACK

## 2023-12-29 ASSESSMENT — SEVERITY ASSESSMENT 2020: SEVERITY 2020: MODERATE

## 2023-12-29 NOTE — PROCEDURE: PRESCRIPTION MEDICATION MANAGEMENT
Continue Regimen: triamcinolone acetonide 0.1 % topical cream BID\\nApply twice daily to affected areas on body x2 weeks, then prn for flares.\\nWill refill today.
Detail Level: Simple
Plan: Discussed topical steroid risk and appropriate use. \\nDiscussed dupixent. Advised patient this is an injection that is given every other week. Advised patient he would need to avoid live vaccines, discussed increased risk of parasitic infections. Patient is not interested in dupixent at this time. \\nDiscussed stronger topical steroid, discussed non steroidal such as Opzelura.\\nDiscussed cibinqo and Rinvoq as well; patient will look into this. Will recheck in 3 months to discuss options again.
Render In Strict Bullet Format?: No

## 2023-12-29 NOTE — PROCEDURE: OTHER
Render Risk Assessment In Note?: yes
Note Text (......Xxx Chief Complaint.): This diagnosis correlates with the
Detail Level: Simple
Other (Free Text): Seen and diagnosed initially by Dr. Benavidez. Patient states this issue started about 4-5 years ago. Never biopsied.

## 2024-03-11 ENCOUNTER — TELEPHONE (OUTPATIENT)
Dept: FAMILY MEDICINE CLINIC | Facility: CLINIC | Age: 64
End: 2024-03-11

## 2024-03-11 DIAGNOSIS — R00.1 BRADYCARDIA: Primary | ICD-10-CM

## 2024-03-11 NOTE — TELEPHONE ENCOUNTER
Patient states that he had a consult w/ an oral surgeon for wisdom teeth extraction. During the consult, the surgeon informed patient that his heart rate was too low at 42 bpm. Patient states that he wears an Apple Watch & he has noticed it in the 50s. Last office visit, dated 11/6/23, shows heart rate was 59. States surgeon will not perform extraction without clearance.

## 2024-03-11 NOTE — TELEPHONE ENCOUNTER
Called patient, notifying of Dr. Ortiz's response. Patient verbalized understanding and thanked. Scheduled lab appt for tomorrow morning at 3/12 at 8:30am. Informed that cardiology should be reaching out to schedule him an appt. He verbalized understanding.

## 2024-03-12 DIAGNOSIS — R00.1 BRADYCARDIA: ICD-10-CM

## 2024-03-12 LAB
ANION GAP SERPL CALC-SCNC: 5 MMOL/L (ref 2–11)
BUN SERPL-MCNC: 16 MG/DL (ref 8–23)
CALCIUM SERPL-MCNC: 9.4 MG/DL (ref 8.3–10.4)
CHLORIDE SERPL-SCNC: 110 MMOL/L (ref 103–113)
CO2 SERPL-SCNC: 27 MMOL/L (ref 21–32)
CREAT SERPL-MCNC: 1.2 MG/DL (ref 0.8–1.5)
ERYTHROCYTE [DISTWIDTH] IN BLOOD BY AUTOMATED COUNT: 14.3 % (ref 11.9–14.6)
GLUCOSE SERPL-MCNC: 87 MG/DL (ref 65–100)
HCT VFR BLD AUTO: 44.2 % (ref 41.1–50.3)
HGB BLD-MCNC: 14.1 G/DL (ref 13.6–17.2)
MAGNESIUM SERPL-MCNC: 2.3 MG/DL (ref 1.8–2.4)
MCH RBC QN AUTO: 29.8 PG (ref 26.1–32.9)
MCHC RBC AUTO-ENTMCNC: 31.9 G/DL (ref 31.4–35)
MCV RBC AUTO: 93.4 FL (ref 82–102)
NRBC # BLD: 0 K/UL (ref 0–0.2)
PLATELET # BLD AUTO: 180 K/UL (ref 150–450)
PMV BLD AUTO: 14.7 FL (ref 9.4–12.3)
POTASSIUM SERPL-SCNC: 4.5 MMOL/L (ref 3.5–5.1)
RBC # BLD AUTO: 4.73 M/UL (ref 4.23–5.6)
SODIUM SERPL-SCNC: 142 MMOL/L (ref 136–146)
TSH W FREE THYROID IF ABNORMAL: 2.73 UIU/ML (ref 0.36–3.74)
WBC # BLD AUTO: 7.9 K/UL (ref 4.3–11.1)

## 2024-03-28 ENCOUNTER — APPOINTMENT (RX ONLY)
Dept: URBAN - METROPOLITAN AREA CLINIC 330 | Facility: CLINIC | Age: 64
Setting detail: DERMATOLOGY
End: 2024-03-28

## 2024-03-28 DIAGNOSIS — L20.89 OTHER ATOPIC DERMATITIS: ICD-10-CM | Status: INADEQUATELY CONTROLLED

## 2024-03-28 PROCEDURE — ? MDM - TREATMENT GOALS

## 2024-03-28 PROCEDURE — ? ADDITIONAL NOTES

## 2024-03-28 PROCEDURE — ? PRESCRIPTION MEDICATION MANAGEMENT

## 2024-03-28 PROCEDURE — ? PRESCRIPTION

## 2024-03-28 PROCEDURE — ? COUNSELING

## 2024-03-28 PROCEDURE — ? OTHER

## 2024-03-28 PROCEDURE — 99214 OFFICE O/P EST MOD 30 MIN: CPT

## 2024-03-28 RX ORDER — TRIAMCINOLONE ACETONIDE 1 MG/G
OINTMENT TOPICAL
Qty: 453.6 | Refills: 2 | Status: ERX

## 2024-03-28 RX ORDER — RUXOLITINIB 15 MG/G
CREAM TOPICAL
Qty: 60 | Refills: 2 | Status: ERX

## 2024-03-28 RX ORDER — CLOBETASOL PROPIONATE 0.5 MG/G
OINTMENT TOPICAL
Qty: 60 | Refills: 2 | Status: ERX

## 2024-03-28 ASSESSMENT — BSA ECZEMA: % BODY COVERED IN ECZEMA: 25

## 2024-03-28 ASSESSMENT — LOCATION DETAILED DESCRIPTION DERM: LOCATION DETAILED: RIGHT INFERIOR MEDIAL UPPER BACK

## 2024-03-28 ASSESSMENT — LOCATION ZONE DERM: LOCATION ZONE: TRUNK

## 2024-03-28 ASSESSMENT — SEVERITY ASSESSMENT 2020: SEVERITY 2020: MODERATE

## 2024-03-28 ASSESSMENT — ITCH NUMERIC RATING SCALE: HOW SEVERE IS YOUR ITCHING?: 4

## 2024-03-28 ASSESSMENT — LOCATION SIMPLE DESCRIPTION DERM: LOCATION SIMPLE: RIGHT UPPER BACK

## 2024-03-28 NOTE — PROCEDURE: OTHER
Other (Free Text): Compared to photos this condition has improved on legs, back remains unchanged, pt has not been able to apply medication to back. Back applicator given today.
Render Risk Assessment In Note?: yes
Note Text (......Xxx Chief Complaint.): This diagnosis correlates with the
Detail Level: Simple

## 2024-03-28 NOTE — PROCEDURE: PRESCRIPTION MEDICATION MANAGEMENT
Continue Regimen: triamcinolone acetonide 0.1 % topical cream BID\\nApply twice daily to affected areas on body x2 weeks, then prn for flares.\\nNo refills needed today\\nContinue clobetasol bid x 2 weeks prn for bad flares \\nContinue Opzelura bid \\nRefill will be sent today.
Detail Level: Simple
Plan: Still having flares of his back. Back applicator given today.
Render In Strict Bullet Format?: No

## 2024-03-28 NOTE — PROCEDURE: MIPS QUALITY
Quality 226: Preventive Care And Screening: Tobacco Use: Screening And Cessation Intervention: Patient screened for tobacco use and is an ex/non-smoker
Quality 130: Documentation Of Current Medications In The Medical Record: Current Medications Documented
Detail Level: Detailed
Quality 431: Preventive Care And Screening: Unhealthy Alcohol Use - Screening: Patient not identified as an unhealthy alcohol user when screened for unhealthy alcohol use using a systematic screening method
Quality 486: Dermatitis - Improvement In Patient-Reported Itch Severity: Itch severity assessment score is reduced by 3 or more points from the initial (index) assessment score to the follow-up visit score

## 2024-04-04 ENCOUNTER — INITIAL CONSULT (OUTPATIENT)
Age: 64
End: 2024-04-04
Payer: COMMERCIAL

## 2024-04-04 VITALS
BODY MASS INDEX: 34.5 KG/M2 | DIASTOLIC BLOOD PRESSURE: 70 MMHG | HEIGHT: 70 IN | HEART RATE: 50 BPM | WEIGHT: 241 LBS | SYSTOLIC BLOOD PRESSURE: 138 MMHG

## 2024-04-04 DIAGNOSIS — I10 ESSENTIAL HYPERTENSION: ICD-10-CM

## 2024-04-04 DIAGNOSIS — Z82.49 FAMILY HISTORY OF AORTIC ANEURYSM: ICD-10-CM

## 2024-04-04 DIAGNOSIS — Z01.810 PREOPERATIVE CARDIOVASCULAR EXAMINATION: ICD-10-CM

## 2024-04-04 DIAGNOSIS — R00.1 BRADYCARDIA: Primary | ICD-10-CM

## 2024-04-04 PROCEDURE — 99244 OFF/OP CNSLTJ NEW/EST MOD 40: CPT | Performed by: INTERNAL MEDICINE

## 2024-04-04 PROCEDURE — 3078F DIAST BP <80 MM HG: CPT | Performed by: INTERNAL MEDICINE

## 2024-04-04 PROCEDURE — 93000 ELECTROCARDIOGRAM COMPLETE: CPT | Performed by: INTERNAL MEDICINE

## 2024-04-04 PROCEDURE — 3075F SYST BP GE 130 - 139MM HG: CPT | Performed by: INTERNAL MEDICINE

## 2024-04-04 ASSESSMENT — ENCOUNTER SYMPTOMS
ABDOMINAL PAIN: 0
HEMOPTYSIS: 0
STRIDOR: 0
DOUBLE VISION: 0
HEMATEMESIS: 0
HEMATOCHEZIA: 0
WHEEZING: 0
HOARSE VOICE: 0

## 2024-04-04 NOTE — PROGRESS NOTES
[facility] CARDIOLOGY  [address] [place] [address], [address]  [place] [address]  PHONE: [phone]          24    NAME:  Forest Jeronimo  : 1960  MRN: [id number]         SUBJECTIVE:   Forest Jeronimo is a 63 y.o. male seen for a visit regarding the following:     Chief Complaint   Patient presents with    Consultation     Bradycardia           HPI:    Cardio problem list:  1.  Bradycardia  2.  Family history of AAA  -2023-ultrasound with no AAA  3.  Preoperative cardiovascular risk exam  4. Hypertension    Dear Dr. Ortiz,  I saw Mr. Jeronimo is a pleasant 63-year-old gentleman in cardiovascular consultation for bradycardia with preoperative cardiovascular risk exam and a family history of abdominal aortic aneurysms.    Bradycardia/blocked PACs-irregular heartbeat noted and he has also noticed this at his dental visit along with his watch telling him that his heart rates are slow and irregular at times.  No TIAs or strokelike symptoms.  He remains very active and works out at least 3 days a week at the gym.  No lightheadedness or dizziness or presyncope or syncope.   No chest pain in any way.  No significant worsening dyspnea exertion orthopnea PND or lower extremity edema.    Hypertension-no formal diagnosis of this although his EKG is suggestive of mild hypertensive heart disease.  He will monitor blood pressures At home    Family history of abdominal aortic aneurysm-had recent scan in 2023 with no evidence of this    Snores-could have possible sleep apnea-will ask wife if he has any episodes of true apnea.    Preoperative cardiovascular exam-plans for dental surgery-possible extraction/filling-okay from a cardiac perspective to proceed with his good baseline functional capacity of much more than 4 metabolic equivalents and in the absence of any active cardiac conditions    Past Medical History, Past Surgical History, Family history, Social History, and Medications were all reviewed with

## 2024-04-10 ENCOUNTER — TELEPHONE (OUTPATIENT)
Age: 64
End: 2024-04-10

## 2024-04-10 RX ORDER — VALSARTAN AND HYDROCHLOROTHIAZIDE 160; 12.5 MG/1; MG/1
1 TABLET, FILM COATED ORAL DAILY
Qty: 90 TABLET | Refills: 3 | Status: SHIPPED | OUTPATIENT
Start: 2024-04-10

## 2024-04-10 NOTE — TELEPHONE ENCOUNTER
Pt got a new arm cuff BP monitor and states his BP is running high. Earlier this morning it was 167/87. Now, it's 167/100. States both times he checked it his cuff also detected an irregular heartbeat. Denies CP and SOB.

## 2024-04-10 NOTE — TELEPHONE ENCOUNTER
Yuval Cox MD Keener, Lynn F RN  Caller: Unspecified (Today,  9:17 AM)  Those pressures are high-clearly in the range of grade 2 hypertension.  I would recommend starting valsartan/hydrochlorothiazide 160/12.5 mg daily.  He will need to continue to monitor his pressures once daily about 1 hour after taking his meds every morning-keep a log of these readings-heart rates and blood pressures and bring this in with him to his follow-up visit with us.  Thanks,  AD

## 2024-04-10 NOTE — TELEPHONE ENCOUNTER
When getting ready for bed, last night, BP-178/96 on new Equate 8000 series home BP monitor.   At 5:45 am, today, before workout, BP-167/87.   After workout, BP-167/100.   Felt good during workout.   No headache or blurred vision.   Not currently taking any BP meds.  Returned 3 day Holter monitor on 4/7/24.   Echo scheduled 5/2/24. Next scheduled appointment with Dr. Jesus is 6/4/24.     Patient asks for Dr. Jesus's recommendations for high BP. He asks how often he should check BP.

## 2024-04-10 NOTE — TELEPHONE ENCOUNTER
Advised patient of Dr. Jesus's response. Advised patient that BP may take at least 5-7 days to stabilize. Advised patient to call with any further questions or concerns prior to appointment. Patient verbalized understanding.   Requested Prescriptions     Signed Prescriptions Disp Refills    valsartan-hydroCHLOROthiazide (DIOVAN-HCT) 160-12.5 MG per tablet 90 tablet 3     Sig: Take 1 tablet by mouth daily     Authorizing Provider: YUVAL JESUS     Ordering User: ILEANA BLOUNT     Valsartan-HCTZ 160-12.5 mg qd, as above, E-prescribed to [facility] in [place] at patient's request.

## 2024-04-16 ENCOUNTER — TELEPHONE (OUTPATIENT)
Age: 64
End: 2024-04-16

## 2024-04-16 NOTE — TELEPHONE ENCOUNTER
Yuval Cox MD  You6 minutes ago (9:12 AM)       Holter results noted-agree, short runs of complete heart block noted with junctional escape rhythm was in the 30s-  -we need to get him through an echocardiogram soon-hopefully soon otherwise scheduled and have him follow-up with me with results of both of these.  He needs to let us know if he has any passing out spells.  Thanks,  AD

## 2024-04-16 NOTE — TELEPHONE ENCOUNTER
I called and informed pt.of MD response and he v/u.I sent note to scheduling to seeif ECHO and fu appt.w/ can be moved to sooner dates.

## 2024-04-16 NOTE — TELEPHONE ENCOUNTER
Rec'd notification from [employer] that pt.wore a monitor from April 4-7th and had 2 episodes of complete heart block total of 16 seconds.Report is available online.    I downloaded the report to 's in box.

## 2024-04-29 ENCOUNTER — OFFICE VISIT (OUTPATIENT)
Age: 64
End: 2024-04-29
Payer: COMMERCIAL

## 2024-04-29 VITALS
SYSTOLIC BLOOD PRESSURE: 108 MMHG | BODY MASS INDEX: 34.07 KG/M2 | HEIGHT: 70 IN | HEART RATE: 64 BPM | WEIGHT: 238 LBS | DIASTOLIC BLOOD PRESSURE: 70 MMHG

## 2024-04-29 DIAGNOSIS — Z82.49 FAMILY HISTORY OF AORTIC ANEURYSM: ICD-10-CM

## 2024-04-29 DIAGNOSIS — R00.1 BRADYCARDIA: Primary | ICD-10-CM

## 2024-04-29 DIAGNOSIS — I10 ESSENTIAL HYPERTENSION: ICD-10-CM

## 2024-04-29 DIAGNOSIS — I44.2 AV BLOCK, 3RD DEGREE (HCC): ICD-10-CM

## 2024-04-29 PROCEDURE — 99214 OFFICE O/P EST MOD 30 MIN: CPT | Performed by: INTERNAL MEDICINE

## 2024-04-29 PROCEDURE — 3074F SYST BP LT 130 MM HG: CPT | Performed by: INTERNAL MEDICINE

## 2024-04-29 PROCEDURE — 3078F DIAST BP <80 MM HG: CPT | Performed by: INTERNAL MEDICINE

## 2024-04-29 RX ORDER — VALSARTAN AND HYDROCHLOROTHIAZIDE 320; 12.5 MG/1; MG/1
1 TABLET, FILM COATED ORAL DAILY
Qty: 90 TABLET | Refills: 3 | Status: SHIPPED | OUTPATIENT
Start: 2024-04-29

## 2024-04-29 ASSESSMENT — ENCOUNTER SYMPTOMS
HOARSE VOICE: 0
EYE REDNESS: 0
HEMATEMESIS: 0
WHEEZING: 0
DOUBLE VISION: 0
HEMATOCHEZIA: 0
HEMOPTYSIS: 0
ABDOMINAL PAIN: 0
STRIDOR: 0

## 2024-04-29 NOTE — PROGRESS NOTES
were reviewed with the patient today.       Lab Results   Component Value Date/Time    CHOL 146 11/06/2023 01:47 PM    HDL 62 11/06/2023 01:47 PM   ,hemoglobin, basic metabolic panel, No results found for: \"TSH\", \"TSH2\", \"TSH3\" ,  Lab Results   Component Value Date/Time     03/12/2024 08:52 AM    K 4.5 03/12/2024 08:52 AM     03/12/2024 08:52 AM    CO2 27 03/12/2024 08:52 AM    BUN 16 03/12/2024 08:52 AM    GFRAA >60 09/12/2022 03:40 PM    GLOB 3.1 11/06/2023 01:47 PM    ALT 37 11/06/2023 01:47 PM    AST 21 11/06/2023 01:47 PM      Lab Results   Component Value Date    LDLCALC 72.8 11/06/2023      Lab Results   Component Value Date    CREATININE 1.20 03/12/2024      Lab Results   Component Value Date    HGB 14.1 03/12/2024      Lab Results   Component Value Date     03/12/2024        Lab Results   Component Value Date    TSHELE 2.73 03/12/2024      EKG from 4/4/2024-sinus bradycardia with first-degree AV block with MA interval of 252 ms, blocked PACs With compensatory pauses  Left atrial enlargement, LVH with repolarization changes secondary to this- personally reviewed with him    ASSESSMENT and PLAN    Bradycardia/transient complete heart block  Holter monitor -shows sinus rhythm with first-degree block with nonspecific IVCD but he has intermittent short runs of complete heart block that occurred at any point during the day.  He is completely asymptomatic from this.  No strong reason to treat him for it.  Not on any AV reina blocking agents.  Has not Talal eyedrops but he needs this for his glaucoma.    Family history of aortic aneurysm  -Most recent abdominal aortic aneurysm scan was normal    Essential hypertension  Echo with only mild concentric LVH and no significant additional valvular heart disease.  Aortic dimensions have been reasonable.  Will continue monitor this over time.  I increased his valsartan dosing mainly because of the need for better control of blood pressures.       Overall

## 2024-07-16 ENCOUNTER — TELEPHONE (OUTPATIENT)
Age: 64
End: 2024-07-16

## 2024-07-16 NOTE — TELEPHONE ENCOUNTER
Called and informed pt of Dr. Jesus's response.  Per verb understanding.  Pt denies dizziness, light headedness, or excessive fatigue.  I advised pt if he does experiences symptoms as above to contact our office. Pt v/u.

## 2024-07-16 NOTE — TELEPHONE ENCOUNTER
Called s/w pt.  Pt reports that his Apple Watch alerted him x's 3 last week that his heart rate was below 40.   HR 69 per Apple Watch at this time.   Denies CP, dizziness, light headedness, palpitations, had some SOB last week w/exertion.  Checks BP on occasion.  Yesterday BP-164/84  HR-43    July 7  BP-152/85  HR-57  Current meds include Valsartan/HCTZ 320/12.5mg QD, Pantoprazole QD, Sildenafil.  Pt states taking meds as prescribed.  Wore heart monitor April 2024.

## 2024-07-16 NOTE — TELEPHONE ENCOUNTER
You routed conversation to [facility] TriageJust now (2:38 PM)     Myrna Pabon routed conversation to [facility] Msvnpn15 minutes ago (1:51 PM)     Forest Jeronimo  P [facility] [place]  Staff55 minutes ago (1:43 PM)       Appointment Request From: Forest Jeronimo     With Provider: Yuval Jesus MD [[facility] CARDIOLOGY]     Preferred Date Range: Any     Preferred Times: Any Time     Reason for visit: Request an Appointment     Comments:  Low heart rate warnings and concerns regarding blood pressure readings.

## 2024-10-29 ENCOUNTER — OFFICE VISIT (OUTPATIENT)
Age: 64
End: 2024-10-29
Payer: COMMERCIAL

## 2024-10-29 VITALS
HEART RATE: 48 BPM | HEIGHT: 70 IN | BODY MASS INDEX: 34.65 KG/M2 | WEIGHT: 242 LBS | SYSTOLIC BLOOD PRESSURE: 142 MMHG | DIASTOLIC BLOOD PRESSURE: 76 MMHG

## 2024-10-29 DIAGNOSIS — R00.1 BRADYCARDIA: Primary | ICD-10-CM

## 2024-10-29 DIAGNOSIS — I10 ESSENTIAL HYPERTENSION: ICD-10-CM

## 2024-10-29 DIAGNOSIS — Z82.49 FAMILY HISTORY OF AORTIC ANEURYSM: ICD-10-CM

## 2024-10-29 PROCEDURE — 99214 OFFICE O/P EST MOD 30 MIN: CPT | Performed by: INTERNAL MEDICINE

## 2024-10-29 PROCEDURE — 3077F SYST BP >= 140 MM HG: CPT | Performed by: INTERNAL MEDICINE

## 2024-10-29 PROCEDURE — 3078F DIAST BP <80 MM HG: CPT | Performed by: INTERNAL MEDICINE

## 2024-10-29 ASSESSMENT — ENCOUNTER SYMPTOMS
EYE REDNESS: 0
STRIDOR: 0
HEMATEMESIS: 0
HEMOPTYSIS: 0
HOARSE VOICE: 0
DOUBLE VISION: 0
ABDOMINAL PAIN: 0
HEMATOCHEZIA: 0
WHEEZING: 0

## 2024-10-29 NOTE — PROGRESS NOTES
[facility] CARDIOLOGY  [address] [place] [address], [address]  [place] [address]  PHONE: [phone]          10/29/24    NAME:  Forest Jeronimo  : 1960  MRN: [id number]         SUBJECTIVE:   Forest Jeronimo is a 63 y.o. male seen for a visit regarding the following:     Chief Complaint   Patient presents with    6 Month Follow-Up           HPI:    Cardio problem list:  1.  Bradycardia  -3-day Holter-2024-sinus rhythm with average heart rate of 65 bpm with baseline first-degree AV block and bundle branch block, 2 episodes-CHB-lasting total of 16 seconds with junctional escape rhythm, occasional PVCs-1.4% of all beats  2.  Family history of AAA  -2023-ultrasound with no AAA  3.  Preoperative cardiovascular risk exam  4. Hypertension  -Echo-2024-EF 61%, mildly dilated ascending aorta measuring 3.9 cm, mild concentric LVH, mildly dilated left atrium-personally reviewed    Dear Dr. Ortiz,  I saw Mr. Jeronimo is a pleasant 63-year-old gentleman in cardiovascular consultation for bradycardia with preoperative cardiovascular risk exam and a family history of abdominal aortic aneurysms.    When we last met with him, we increased his valsartan/hydrochlorothiazide to 320/12.5 mg daily for better control of blood pressure.    Bradycardia/blocked PACs/complete heart block-intermittent-no symptoms associated with this.  Irregular heartbeat noted and he has also noticed this at his dental visit along with his watch telling him that his heart rates are slow and irregular at times.  No TIAs or strokelike symptoms.  He remains very active and works out at least 3 days a week at the gym.  No lightheadedness or dizziness or presyncope or syncope.   No chest pain in any way.  No significant worsening dyspnea exertion orthopnea PND or lower extremity edema.    Hypertension-blood pressures have been elevated even at home.  We increased his valsartan/hydrochlorothiazide dosing.  Average blood pressures have still been

## 2024-11-11 ASSESSMENT — PATIENT HEALTH QUESTIONNAIRE - PHQ9
SUM OF ALL RESPONSES TO PHQ9 QUESTIONS 1 & 2: 0
SUM OF ALL RESPONSES TO PHQ9 QUESTIONS 1 & 2: 0
1. LITTLE INTEREST OR PLEASURE IN DOING THINGS: NOT AT ALL
1. LITTLE INTEREST OR PLEASURE IN DOING THINGS: NOT AT ALL
2. FEELING DOWN, DEPRESSED OR HOPELESS: NOT AT ALL
SUM OF ALL RESPONSES TO PHQ QUESTIONS 1-9: 0
2. FEELING DOWN, DEPRESSED OR HOPELESS: NOT AT ALL

## 2024-11-12 ENCOUNTER — OFFICE VISIT (OUTPATIENT)
Dept: FAMILY MEDICINE CLINIC | Facility: CLINIC | Age: 64
End: 2024-11-12
Payer: COMMERCIAL

## 2024-11-12 VITALS
SYSTOLIC BLOOD PRESSURE: 134 MMHG | DIASTOLIC BLOOD PRESSURE: 72 MMHG | WEIGHT: 239 LBS | TEMPERATURE: 98.2 F | OXYGEN SATURATION: 98 % | BODY MASS INDEX: 34.29 KG/M2 | HEART RATE: 76 BPM

## 2024-11-12 DIAGNOSIS — N52.9 ERECTILE DYSFUNCTION, UNSPECIFIED ERECTILE DYSFUNCTION TYPE: ICD-10-CM

## 2024-11-12 DIAGNOSIS — I10 ESSENTIAL HYPERTENSION: ICD-10-CM

## 2024-11-12 DIAGNOSIS — N50.89 TESTICULAR LUMP: ICD-10-CM

## 2024-11-12 DIAGNOSIS — R97.20 ELEVATED PSA: ICD-10-CM

## 2024-11-12 DIAGNOSIS — Z00.00 ENCOUNTER FOR ROUTINE ADULT HEALTH EXAMINATION WITHOUT ABNORMAL FINDINGS: ICD-10-CM

## 2024-11-12 DIAGNOSIS — K21.9 GASTROESOPHAGEAL REFLUX DISEASE, UNSPECIFIED WHETHER ESOPHAGITIS PRESENT: ICD-10-CM

## 2024-11-12 DIAGNOSIS — Z00.00 ENCOUNTER FOR ROUTINE ADULT HEALTH EXAMINATION WITHOUT ABNORMAL FINDINGS: Primary | ICD-10-CM

## 2024-11-12 LAB
ALBUMIN SERPL-MCNC: 3.8 G/DL (ref 3.2–4.6)
ALBUMIN/GLOB SERPL: 1.1 (ref 1–1.9)
ALP SERPL-CCNC: 51 U/L (ref 40–129)
ALT SERPL-CCNC: 25 U/L (ref 8–55)
ANION GAP SERPL CALC-SCNC: 14 MMOL/L (ref 7–16)
AST SERPL-CCNC: 21 U/L (ref 15–37)
BASOPHILS # BLD: 0.1 K/UL (ref 0–0.2)
BASOPHILS NFR BLD: 0 % (ref 0–2)
BILIRUB SERPL-MCNC: 0.6 MG/DL (ref 0–1.2)
BUN SERPL-MCNC: 20 MG/DL (ref 8–23)
CALCIUM SERPL-MCNC: 9.7 MG/DL (ref 8.8–10.2)
CHLORIDE SERPL-SCNC: 103 MMOL/L (ref 98–107)
CHOLEST SERPL-MCNC: 158 MG/DL (ref 0–200)
CO2 SERPL-SCNC: 24 MMOL/L (ref 20–29)
CREAT SERPL-MCNC: 1.15 MG/DL (ref 0.8–1.3)
DIFFERENTIAL METHOD BLD: ABNORMAL
EOSINOPHIL # BLD: 0 K/UL (ref 0–0.8)
EOSINOPHIL NFR BLD: 0 % (ref 0.5–7.8)
ERYTHROCYTE [DISTWIDTH] IN BLOOD BY AUTOMATED COUNT: 14.6 % (ref 11.9–14.6)
GLOBULIN SER CALC-MCNC: 3.3 G/DL (ref 2.3–3.5)
GLUCOSE SERPL-MCNC: 90 MG/DL (ref 70–99)
HCT VFR BLD AUTO: 45.5 % (ref 41.1–50.3)
HDLC SERPL-MCNC: 55 MG/DL (ref 40–60)
HDLC SERPL: 2.9 (ref 0–5)
HGB BLD-MCNC: 15 G/DL (ref 13.6–17.2)
IMM GRANULOCYTES # BLD AUTO: 0.2 K/UL (ref 0–0.5)
IMM GRANULOCYTES NFR BLD AUTO: 1 % (ref 0–5)
LDLC SERPL CALC-MCNC: 85 MG/DL (ref 0–100)
LYMPHOCYTES # BLD: 2.5 K/UL (ref 0.5–4.6)
LYMPHOCYTES NFR BLD: 13 % (ref 13–44)
MCH RBC QN AUTO: 29.8 PG (ref 26.1–32.9)
MCHC RBC AUTO-ENTMCNC: 33 G/DL (ref 31.4–35)
MCV RBC AUTO: 90.5 FL (ref 82–102)
MONOCYTES # BLD: 1.4 K/UL (ref 0.1–1.3)
MONOCYTES NFR BLD: 7 % (ref 4–12)
NEUTS SEG # BLD: 14.8 K/UL (ref 1.7–8.2)
NEUTS SEG NFR BLD: 78 % (ref 43–78)
NRBC # BLD: 0 K/UL (ref 0–0.2)
PLATELET # BLD AUTO: 207 K/UL (ref 150–450)
PMV BLD AUTO: 14 FL (ref 9.4–12.3)
POTASSIUM SERPL-SCNC: 3.8 MMOL/L (ref 3.5–5.1)
PROT SERPL-MCNC: 7.1 G/DL (ref 6.3–8.2)
PSA SERPL-MCNC: 6.1 NG/ML (ref 0–4)
RBC # BLD AUTO: 5.03 M/UL (ref 4.23–5.6)
SODIUM SERPL-SCNC: 141 MMOL/L (ref 136–145)
TRIGL SERPL-MCNC: 89 MG/DL (ref 0–150)
VLDLC SERPL CALC-MCNC: 18 MG/DL (ref 6–23)
WBC # BLD AUTO: 18.9 K/UL (ref 4.3–11.1)

## 2024-11-12 PROCEDURE — 3078F DIAST BP <80 MM HG: CPT | Performed by: FAMILY MEDICINE

## 2024-11-12 PROCEDURE — 3075F SYST BP GE 130 - 139MM HG: CPT | Performed by: FAMILY MEDICINE

## 2024-11-12 PROCEDURE — 99396 PREV VISIT EST AGE 40-64: CPT | Performed by: FAMILY MEDICINE

## 2024-11-12 RX ORDER — PANTOPRAZOLE SODIUM 40 MG/1
40 TABLET, DELAYED RELEASE ORAL DAILY
Qty: 90 TABLET | Refills: 3 | Status: SHIPPED | OUTPATIENT
Start: 2024-11-12

## 2024-11-12 RX ORDER — SILDENAFIL 100 MG/1
100 TABLET, FILM COATED ORAL DAILY PRN
Qty: 30 TABLET | Refills: 5 | Status: SHIPPED | OUTPATIENT
Start: 2024-11-12

## 2024-11-12 SDOH — ECONOMIC STABILITY: INCOME INSECURITY: HOW HARD IS IT FOR YOU TO PAY FOR THE VERY BASICS LIKE FOOD, HOUSING, MEDICAL CARE, AND HEATING?: NOT HARD AT ALL

## 2024-11-12 SDOH — ECONOMIC STABILITY: FOOD INSECURITY: WITHIN THE PAST 12 MONTHS, THE FOOD YOU BOUGHT JUST DIDN'T LAST AND YOU DIDN'T HAVE MONEY TO GET MORE.: NEVER TRUE

## 2024-11-12 SDOH — ECONOMIC STABILITY: FOOD INSECURITY: WITHIN THE PAST 12 MONTHS, YOU WORRIED THAT YOUR FOOD WOULD RUN OUT BEFORE YOU GOT MONEY TO BUY MORE.: NEVER TRUE

## 2024-11-12 ASSESSMENT — ENCOUNTER SYMPTOMS
SHORTNESS OF BREATH: 0
COUGH: 0
ABDOMINAL PAIN: 0
CHEST TIGHTNESS: 0
ROS SKIN COMMENTS: ITCHING
WHEEZING: 0

## 2024-11-12 NOTE — ASSESSMENT & PLAN NOTE
Previously seen by urology.  [place] to be epididymal cyst which is benign.  Patient feels increasing in size.  Referral back to urology

## 2024-11-12 NOTE — PROGRESS NOTES
Forest Jeronimo is a 64 y.o. male who presents today for the following:  Chief Complaint   Patient presents with    Annual Exam     Pt presents today for annual exam. Pt has lump on lt testicle.        No Known Allergies    Current Outpatient Medications   Medication Sig Dispense Refill    valsartan-hydroCHLOROthiazide (DIOVAN-HCT) 320-12.5 MG per tablet Take 1 tablet by mouth daily 90 tablet 3    betamethasone dipropionate 0.05 % cream Apply topically 2 times daily. 45 g 3    pantoprazole (PROTONIX) 40 MG tablet Take 1 tablet by mouth daily 90 tablet 3    sildenafil (VIAGRA) 100 MG tablet Take 1 tablet by mouth daily as needed for Erectile Dysfunction Take 1/2 - 1 tablet 30 minutes prior to intercourse as needed. 30 tablet 5    Timolol (TIMOPTIC) 0.5 % SOLN ophthalmic solution       fluocinolone (DERMOTIC) 0.01 % OIL oil 1 drop in affected ear TID, PRN      latanoprost (XALATAN) 0.005 % ophthalmic solution INSTILL ONE DROP INTO EACH EYE EVERY NIGHT AS DIRECTED       No current facility-administered medications for this visit.       Past Medical History:   Diagnosis Date    Cerumen impaction     Ear pain     Gastroesophageal reflux disease 7/20/2016    Otitis externa 7/20/2016    Unspecified hearing loss, unspecified ear        Past Surgical History:   Procedure Laterality Date    KNEE ARTHROSCOPY      KNEE CARTILAGE SURGERY         Social History     Tobacco Use    Smoking status: Never    Smokeless tobacco: Never   Substance Use Topics    Alcohol use: Yes     Comment: Rearly have any        Family History   Problem Relation Age of Onset    Abdominal aortic aneurysm Mother     Abdominal aortic aneurysm Father     Colon Cancer Brother 51    Prostate Cancer Neg Hx        Is a 64-year-old gentleman here today for annual physical. He has the following chronic medical problems.  -family history of AAA: checked with ultrasounds every few years. On ECHO did have mildly dilated ascending aorta.  -hypertension: taking

## 2024-11-12 NOTE — ASSESSMENT & PLAN NOTE
Also managed by cardiology.  No concerns today.  Encouraged compliance along with lifestyle changes.

## 2024-12-30 ENCOUNTER — OFFICE VISIT (OUTPATIENT)
Dept: UROLOGY | Age: 64
End: 2024-12-30
Payer: COMMERCIAL

## 2024-12-30 VITALS — HEIGHT: 70 IN | BODY MASS INDEX: 34.29 KG/M2

## 2024-12-30 DIAGNOSIS — N50.89 TESTICULAR LUMP: Primary | ICD-10-CM

## 2024-12-30 LAB
BILIRUBIN, URINE, POC: NEGATIVE
BLOOD URINE, POC: NEGATIVE
GLUCOSE URINE, POC: NEGATIVE MG/DL
KETONES, URINE, POC: NEGATIVE MG/DL
LEUKOCYTE ESTERASE, URINE, POC: NEGATIVE
NITRITE, URINE, POC: NEGATIVE
PH, URINE, POC: NORMAL (ref 4.6–8)
PROTEIN,URINE, POC: NEGATIVE MG/DL
SPECIFIC GRAVITY, URINE, POC: 1.02 (ref 1–1.03)
URINALYSIS CLARITY, POC: CLEAR
URINALYSIS COLOR, POC: YELLOW
UROBILINOGEN, POC: NORMAL MG/DL

## 2024-12-30 PROCEDURE — 81003 URINALYSIS AUTO W/O SCOPE: CPT | Performed by: UROLOGY

## 2024-12-30 PROCEDURE — 99213 OFFICE O/P EST LOW 20 MIN: CPT | Performed by: UROLOGY

## 2024-12-30 NOTE — PROGRESS NOTES
Timolol (TIMOPTIC) 0.5 % SOLN ophthalmic solution       fluocinolone (DERMOTIC) 0.01 % OIL oil 1 drop in affected ear TID, PRN       No current facility-administered medications for this visit.     No Known Allergies  Social History     Socioeconomic History    Marital status:      Spouse name: Not on file    Number of children: Not on file    Years of education: Not on file    Highest education level: Not on file   Occupational History    Not on file   Tobacco Use    Smoking status: Never     Passive exposure: Never    Smokeless tobacco: Never   Substance and Sexual Activity    Alcohol use: Yes     Comment: Rearly have any    Drug use: Never    Sexual activity: Yes     Partners: Female   Other Topics Concern    Not on file   Social History Narrative    Not on file     Social Determinants of Health     Financial Resource Strain: Low Risk  (11/12/2024)    Overall Financial Resource Strain (CARDIA)     Difficulty of Paying Living Expenses: Not hard at all   Food Insecurity: No Food Insecurity (11/12/2024)    Hunger Vital Sign     Worried About Running Out of Food in the Last Year: Never true     Ran Out of Food in the Last Year: Never true   Transportation Needs: Unknown (11/12/2024)    PRAPARE - Transportation     Lack of Transportation (Medical): Not on file     Lack of Transportation (Non-Medical): No   Physical Activity: Not on File (2/15/2021)    Received from [facility]    Physical Activity     Physical Activity: 0   Stress: Not on File (2/15/2021)    Received from [facility]    Stress     Stress: 0   Social Connections: Unknown (3/20/2021)    Received from [facility]    Social Connections     Frequency of Communication with Friends and Family: Not asked     Frequency of Social Gatherings with Friends and Family: Not asked   Intimate Partner Violence: Unknown (3/20/2021)    Received from [facility]    Intimate Partner Violence     Fear of Current or Ex-Partner: Not

## 2025-03-24 ENCOUNTER — APPOINTMENT (OUTPATIENT)
Dept: URBAN - METROPOLITAN AREA CLINIC 330 | Facility: CLINIC | Age: 65
Setting detail: DERMATOLOGY
End: 2025-03-24

## 2025-03-24 DIAGNOSIS — D22 MELANOCYTIC NEVI: ICD-10-CM | Status: STABLE

## 2025-03-24 DIAGNOSIS — Z71.89 OTHER SPECIFIED COUNSELING: ICD-10-CM

## 2025-03-24 DIAGNOSIS — L20.89 OTHER ATOPIC DERMATITIS: ICD-10-CM | Status: WELL CONTROLLED

## 2025-03-24 DIAGNOSIS — L57.8 OTHER SKIN CHANGES DUE TO CHRONIC EXPOSURE TO NONIONIZING RADIATION: ICD-10-CM

## 2025-03-24 PROBLEM — D22.5 MELANOCYTIC NEVI OF TRUNK: Status: ACTIVE | Noted: 2025-03-24

## 2025-03-24 PROCEDURE — ? COUNSELING

## 2025-03-24 PROCEDURE — ? MDM - TREATMENT GOALS

## 2025-03-24 PROCEDURE — 99213 OFFICE O/P EST LOW 20 MIN: CPT

## 2025-03-24 PROCEDURE — ? TREATMENT REGIMEN

## 2025-03-24 PROCEDURE — ? ADDITIONAL NOTES

## 2025-03-24 PROCEDURE — ? EDUCATIONAL RESOURCES PROVIDED

## 2025-03-24 PROCEDURE — ? FULL BODY SKIN EXAM

## 2025-03-24 PROCEDURE — ? PRESCRIPTION MEDICATION MANAGEMENT

## 2025-03-24 PROCEDURE — ? PHOTO-DOCUMENTATION

## 2025-03-24 PROCEDURE — ? PRESCRIPTION

## 2025-03-24 RX ORDER — CLOBETASOL PROPIONATE 0.5 MG/G
OINTMENT TOPICAL
Qty: 60 | Refills: 2 | Status: ERX

## 2025-03-24 RX ORDER — TRIAMCINOLONE ACETONIDE 1 MG/G
OINTMENT TOPICAL
Qty: 453.6 | Refills: 2 | Status: ERX

## 2025-03-24 ASSESSMENT — LOCATION ZONE DERM: LOCATION ZONE: TRUNK

## 2025-03-24 ASSESSMENT — SEVERITY ASSESSMENT 2020: SEVERITY 2020: MILD

## 2025-03-24 ASSESSMENT — BSA ECZEMA: % BODY COVERED IN ECZEMA: 25

## 2025-03-24 ASSESSMENT — LOCATION DETAILED DESCRIPTION DERM
LOCATION DETAILED: RIGHT INFERIOR MEDIAL UPPER BACK
LOCATION DETAILED: SUPERIOR THORACIC SPINE
LOCATION DETAILED: RIGHT MID-UPPER BACK
LOCATION DETAILED: INFERIOR THORACIC SPINE

## 2025-03-24 ASSESSMENT — LOCATION SIMPLE DESCRIPTION DERM
LOCATION SIMPLE: UPPER BACK
LOCATION SIMPLE: RIGHT UPPER BACK

## 2025-03-24 ASSESSMENT — ITCH NUMERIC RATING SCALE: HOW SEVERE IS YOUR ITCHING?: 2

## 2025-03-24 NOTE — PROCEDURE: PRESCRIPTION MEDICATION MANAGEMENT
Continue Regimen: triamcinolone acetonide 0.1 % topical cream BID\\nApply twice daily to affected areas on body x2 weeks, then prn for flares.\\n\\nclobetasol bid x 2 weeks prn for bad flares \\n\\nRefill will be sent today.
Detail Level: Simple
Plan: Patient is not using Opzelura cream, refills not needed
Render In Strict Bullet Format?: No

## 2025-03-24 NOTE — PROCEDURE: TREATMENT REGIMEN
Detail Level: Zone
Samples Given: La Roche Posay , Lipikar  cream.
Plan: All areas show improvement. He never got the opzelura. He is controlling with his steroids.

## 2025-04-28 ENCOUNTER — OFFICE VISIT (OUTPATIENT)
Age: 65
End: 2025-04-28
Payer: COMMERCIAL

## 2025-04-28 VITALS
DIASTOLIC BLOOD PRESSURE: 60 MMHG | RESPIRATION RATE: 18 BRPM | HEART RATE: 41 BPM | SYSTOLIC BLOOD PRESSURE: 158 MMHG | WEIGHT: 239 LBS | BODY MASS INDEX: 34.29 KG/M2

## 2025-04-28 DIAGNOSIS — I10 ESSENTIAL HYPERTENSION: ICD-10-CM

## 2025-04-28 DIAGNOSIS — R00.1 BRADYCARDIA: Primary | ICD-10-CM

## 2025-04-28 DIAGNOSIS — Z82.49 FAMILY HISTORY OF AORTIC ANEURYSM: ICD-10-CM

## 2025-04-28 PROCEDURE — 93000 ELECTROCARDIOGRAM COMPLETE: CPT | Performed by: INTERNAL MEDICINE

## 2025-04-28 PROCEDURE — 3078F DIAST BP <80 MM HG: CPT | Performed by: INTERNAL MEDICINE

## 2025-04-28 PROCEDURE — 3077F SYST BP >= 140 MM HG: CPT | Performed by: INTERNAL MEDICINE

## 2025-04-28 PROCEDURE — 99214 OFFICE O/P EST MOD 30 MIN: CPT | Performed by: INTERNAL MEDICINE

## 2025-04-28 RX ORDER — LATANOPROST 50 UG/ML
SOLUTION/ DROPS OPHTHALMIC
COMMUNITY
Start: 2025-04-15

## 2025-04-28 RX ORDER — VALSARTAN AND HYDROCHLOROTHIAZIDE 320; 12.5 MG/1; MG/1
1 TABLET, FILM COATED ORAL DAILY
Qty: 90 TABLET | Refills: 3 | Status: SHIPPED | OUTPATIENT
Start: 2025-04-28

## 2025-04-28 RX ORDER — AMLODIPINE BESYLATE 5 MG/1
5 TABLET ORAL DAILY
Qty: 90 TABLET | Refills: 3 | Status: SHIPPED | OUTPATIENT
Start: 2025-04-28

## 2025-04-28 ASSESSMENT — ENCOUNTER SYMPTOMS
ABDOMINAL PAIN: 0
HEMATOCHEZIA: 0
HEMOPTYSIS: 0
HEMATEMESIS: 0
WHEEZING: 0
DOUBLE VISION: 0
EYE REDNESS: 0
STRIDOR: 0
HOARSE VOICE: 0

## 2025-04-28 NOTE — PROGRESS NOTES
[facility] CARDIOLOGY  [address] [place] [address], [address]  [place] [address]  PHONE: [phone]          25    NAME:  Forest Jeronimo  : 1960  MRN: [id number]         SUBJECTIVE:   Forest Jeronimo is a 64 y.o. male seen for a visit regarding the following:     Chief Complaint   Patient presents with    Bradycardia    Hypertension           HPI:    Cardio problem list:  1.  Bradycardia  -3-day Holter-2024-sinus rhythm with average heart rate of 65 bpm with baseline first-degree AV block and bundle branch block, 2 episodes-CHB-lasting total of 16 seconds with junctional escape rhythm, occasional PVCs-1.4% of all beats  2.  Family history of AAA  -2023-ultrasound with no AAA  3.  Preoperative cardiovascular risk exam  4. Hypertension  -Echo-2024-EF 61%, mildly dilated ascending aorta measuring 3.9 cm, mild concentric LVH, mildly dilated left atrium-personally reviewed    Dear Dr. Ortiz,  I saw Mr. Jeronimo is a pleasant 64-year-old gentleman in cardiovascular consultation for bradycardia with preoperative cardiovascular risk exam and a family history of abdominal aortic aneurysms.    When we last met with him, we made no significant changes with his medical therapy.  We previously increased his valsartan/hydrochlorothiazide to 320/12.5 mg daily for better control of blood pressure.    Bradycardia/blocked PACs/complete heart block-intermittent-and intermittent high degree AV block no symptoms associated with this.  Irregular heartbeat noted and he has also noticed this at his dental visit along with his watch telling him that his heart rates are slow and irregular at times.  No TIAs or strokelike symptoms.  He remains very active and works out at least 3 days a week at the gym.  No lightheadedness or dizziness or presyncope or syncope.   No chest pain in any way.  No significant worsening dyspnea exertion orthopnea PND or lower extremity edema.    Hypertension-blood pressures have been